# Patient Record
Sex: FEMALE | Race: WHITE | NOT HISPANIC OR LATINO
[De-identification: names, ages, dates, MRNs, and addresses within clinical notes are randomized per-mention and may not be internally consistent; named-entity substitution may affect disease eponyms.]

---

## 2022-12-05 PROBLEM — Z00.00 ENCOUNTER FOR PREVENTIVE HEALTH EXAMINATION: Status: ACTIVE | Noted: 2022-12-05

## 2022-12-13 ENCOUNTER — APPOINTMENT (OUTPATIENT)
Dept: ORTHOPEDIC SURGERY | Facility: CLINIC | Age: 57
End: 2022-12-13

## 2022-12-13 VITALS — HEIGHT: 66 IN | WEIGHT: 150 LBS | BODY MASS INDEX: 24.11 KG/M2

## 2022-12-13 DIAGNOSIS — M75.21 BICIPITAL TENDINITIS, RIGHT SHOULDER: ICD-10-CM

## 2022-12-13 DIAGNOSIS — Z78.9 OTHER SPECIFIED HEALTH STATUS: ICD-10-CM

## 2022-12-13 DIAGNOSIS — S83.412A SPRAIN OF MEDIAL COLLATERAL LIGAMENT OF LEFT KNEE, INITIAL ENCOUNTER: ICD-10-CM

## 2022-12-13 DIAGNOSIS — M25.511 PAIN IN RIGHT SHOULDER: ICD-10-CM

## 2022-12-13 PROCEDURE — 73562 X-RAY EXAM OF KNEE 3: CPT | Mod: LT

## 2022-12-13 PROCEDURE — 99203 OFFICE O/P NEW LOW 30 MIN: CPT

## 2022-12-13 PROCEDURE — 73030 X-RAY EXAM OF SHOULDER: CPT | Mod: RT

## 2022-12-13 NOTE — PHYSICAL EXAM
[Right] : right shoulder [AC Joint Arthrosis] : AC Joint Arthrosis [NL (0)] : extension 0 degrees [5___] : hamstring 5[unfilled]/5 [Negative] : negative Champ's [Left] : left knee [AP] : anteroposterior [Lateral] : lateral [There are no fractures, subluxations or dislocations. No significant abnormalities are seen] : There are no fractures, subluxations or dislocations. No significant abnormalities are seen [] : negative Lachmann [TWNoteComboBox7] : flexion 130 degrees

## 2022-12-13 NOTE — HISTORY OF PRESENT ILLNESS
[Gradual] : gradual [8] : 8 [Dull/Aching] : dull/aching [Constant] : constant [Meds] : meds [Walking] : walking [de-identified] : Has left knee soreness/tightness past few weeks. Active at the gym, pickle ball, doesn't recall an injury. No clicking, locking or giving way. Also right shoulder became sore over the past few days, felt locked up the other day, but now now bad [] : no [FreeTextEntry1] : lt knee [FreeTextEntry3] : 1 month [FreeTextEntry5] : pt stated she has been havin pain in her left knee with nni

## 2022-12-13 NOTE — DISCUSSION/SUMMARY
[de-identified] : For knee, ice, ibuprofen, flexible hinged knee support for sports. For right shoulder ice and observation

## 2022-12-15 ENCOUNTER — OFFICE (OUTPATIENT)
Dept: URBAN - METROPOLITAN AREA CLINIC 104 | Facility: CLINIC | Age: 57
Setting detail: OPHTHALMOLOGY
End: 2022-12-15
Payer: COMMERCIAL

## 2022-12-15 DIAGNOSIS — Z96.1: ICD-10-CM

## 2022-12-15 DIAGNOSIS — H26.493: ICD-10-CM

## 2022-12-15 PROCEDURE — 99024 POSTOP FOLLOW-UP VISIT: CPT | Performed by: OPTOMETRIST

## 2022-12-15 ASSESSMENT — SPHEQUIV_DERIVED
OS_SPHEQUIV: -1.75
OD_SPHEQUIV: 0.125
OS_SPHEQUIV: -1.5

## 2022-12-15 ASSESSMENT — REFRACTION_AUTOREFRACTION
OS_CYLINDER: -1.00
OD_AXIS: 155
OD_SPHERE: +0.25
OS_AXIS: 079
OS_SPHERE: -1.25
OD_CYLINDER: -0.25

## 2022-12-15 ASSESSMENT — REFRACTION_MANIFEST
OS_SPHERE: -1.00
OD_ADD: +2.50
OS_CYLINDER: -1.00
OS_VA1: 20/25
OS_AXIS: 080
OS_ADD: +2.50
OD_VA1: 20/20
OD_SPHERE: PLANO
OD_CYLINDER: -0.25
OD_AXIS: 160

## 2022-12-15 ASSESSMENT — LID EXAM ASSESSMENTS
OD_BLEPHARITIS: RLL RUL 2+
OS_BLEPHARITIS: LLL LUL 2+

## 2022-12-15 ASSESSMENT — VISUAL ACUITY
OS_BCVA: 20/20
OD_BCVA: 20/150

## 2022-12-15 ASSESSMENT — TEAR BREAK UP TIME (TBUT)
OS_TBUT: 1+ 2+
OD_TBUT: 1+ 2+

## 2022-12-15 ASSESSMENT — TONOMETRY
OD_IOP_MMHG: 16
OS_IOP_MMHG: 16

## 2022-12-15 ASSESSMENT — PACHYMETRY
OD_CT_CORRECTION: 1
OD_CT_UM: 539

## 2022-12-15 ASSESSMENT — CONFRONTATIONAL VISUAL FIELD TEST (CVF)
OD_FINDINGS: FULL
OS_FINDINGS: FULL

## 2022-12-19 ENCOUNTER — RX ONLY (RX ONLY)
Age: 57
End: 2022-12-19

## 2022-12-19 ENCOUNTER — OFFICE (OUTPATIENT)
Dept: URBAN - METROPOLITAN AREA CLINIC 104 | Facility: CLINIC | Age: 57
Setting detail: OPHTHALMOLOGY
End: 2022-12-19
Payer: COMMERCIAL

## 2022-12-19 DIAGNOSIS — H26.493: ICD-10-CM

## 2022-12-19 DIAGNOSIS — Z96.1: ICD-10-CM

## 2022-12-19 DIAGNOSIS — H26.492: ICD-10-CM

## 2022-12-19 PROCEDURE — 99024 POSTOP FOLLOW-UP VISIT: CPT | Performed by: OPHTHALMOLOGY

## 2022-12-19 PROCEDURE — 66821 AFTER CATARACT LASER SURGERY: CPT | Performed by: OPHTHALMOLOGY

## 2022-12-19 ASSESSMENT — TEAR BREAK UP TIME (TBUT)
OD_TBUT: 1+ 2+
OS_TBUT: 1+ 2+

## 2022-12-19 ASSESSMENT — LID EXAM ASSESSMENTS
OD_BLEPHARITIS: RLL RUL 2+
OS_BLEPHARITIS: LLL LUL 2+

## 2022-12-19 ASSESSMENT — VISUAL ACUITY
OD_BCVA: 20/150
OS_BCVA: 20/20

## 2022-12-19 ASSESSMENT — TONOMETRY
OS_IOP_MMHG: 11
OD_IOP_MMHG: 11

## 2022-12-19 ASSESSMENT — PACHYMETRY
OD_CT_UM: 539
OD_CT_CORRECTION: 1

## 2022-12-19 ASSESSMENT — CONFRONTATIONAL VISUAL FIELD TEST (CVF)
OD_FINDINGS: FULL
OS_FINDINGS: FULL

## 2022-12-30 ENCOUNTER — APPOINTMENT (OUTPATIENT)
Dept: ORTHOPEDIC SURGERY | Facility: CLINIC | Age: 57
End: 2022-12-30

## 2023-01-20 ENCOUNTER — APPOINTMENT (OUTPATIENT)
Dept: ORTHOPEDIC SURGERY | Facility: CLINIC | Age: 58
End: 2023-01-20

## 2023-01-27 ENCOUNTER — OFFICE (OUTPATIENT)
Dept: URBAN - METROPOLITAN AREA CLINIC 104 | Facility: CLINIC | Age: 58
Setting detail: OPHTHALMOLOGY
End: 2023-01-27
Payer: COMMERCIAL

## 2023-01-27 ENCOUNTER — RX ONLY (RX ONLY)
Age: 58
End: 2023-01-27

## 2023-01-27 DIAGNOSIS — H26.493: ICD-10-CM

## 2023-01-27 DIAGNOSIS — H26.491: ICD-10-CM

## 2023-01-27 PROCEDURE — 66821 AFTER CATARACT LASER SURGERY: CPT | Performed by: OPHTHALMOLOGY

## 2023-01-27 PROCEDURE — 99024 POSTOP FOLLOW-UP VISIT: CPT | Performed by: OPHTHALMOLOGY

## 2023-01-27 ASSESSMENT — TEAR BREAK UP TIME (TBUT)
OD_TBUT: 1+ 2+
OS_TBUT: 1+ 2+

## 2023-01-27 ASSESSMENT — CONFRONTATIONAL VISUAL FIELD TEST (CVF)
OD_FINDINGS: FULL
OS_FINDINGS: FULL

## 2023-01-27 ASSESSMENT — TONOMETRY
OD_IOP_MMHG: 13
OS_IOP_MMHG: 13

## 2023-01-27 ASSESSMENT — PACHYMETRY
OD_CT_CORRECTION: 1
OD_CT_UM: 539

## 2023-01-27 ASSESSMENT — VISUAL ACUITY
OD_BCVA: 20/150
OS_BCVA: 20/25

## 2023-01-27 ASSESSMENT — LID EXAM ASSESSMENTS
OD_BLEPHARITIS: RLL RUL 2+
OS_BLEPHARITIS: LLL LUL 2+

## 2023-02-17 ENCOUNTER — OFFICE (OUTPATIENT)
Dept: URBAN - METROPOLITAN AREA CLINIC 104 | Facility: CLINIC | Age: 58
Setting detail: OPHTHALMOLOGY
End: 2023-02-17
Payer: COMMERCIAL

## 2023-02-17 ENCOUNTER — RX ONLY (RX ONLY)
Age: 58
End: 2023-02-17

## 2023-02-17 DIAGNOSIS — H40.1131: ICD-10-CM

## 2023-02-17 DIAGNOSIS — H26.493: ICD-10-CM

## 2023-02-17 PROCEDURE — 99024 POSTOP FOLLOW-UP VISIT: CPT | Performed by: OPHTHALMOLOGY

## 2023-02-17 ASSESSMENT — LID EXAM ASSESSMENTS
OD_BLEPHARITIS: RLL RUL 2+
OS_BLEPHARITIS: LLL LUL 2+

## 2023-02-17 ASSESSMENT — VISUAL ACUITY
OD_BCVA: 20/125
OS_BCVA: 20/25+2

## 2023-02-17 ASSESSMENT — TEAR BREAK UP TIME (TBUT)
OS_TBUT: 1+ 2+
OD_TBUT: 1+ 2+

## 2023-02-17 ASSESSMENT — PACHYMETRY
OD_CT_UM: 539
OD_CT_CORRECTION: 1

## 2023-02-17 ASSESSMENT — TONOMETRY: OD_IOP_MMHG: 11

## 2023-02-17 ASSESSMENT — CONFRONTATIONAL VISUAL FIELD TEST (CVF)
OS_FINDINGS: FULL
OD_FINDINGS: FULL

## 2023-06-30 ENCOUNTER — OFFICE (OUTPATIENT)
Dept: URBAN - METROPOLITAN AREA CLINIC 104 | Facility: CLINIC | Age: 58
Setting detail: OPHTHALMOLOGY
End: 2023-06-30
Payer: COMMERCIAL

## 2023-06-30 DIAGNOSIS — H26.493: ICD-10-CM

## 2023-06-30 PROCEDURE — 99213 OFFICE O/P EST LOW 20 MIN: CPT | Performed by: OPHTHALMOLOGY

## 2023-06-30 ASSESSMENT — VISUAL ACUITY
OD_BCVA: 20/150+2
OS_BCVA: 20/25-1

## 2023-06-30 ASSESSMENT — REFRACTION_AUTOREFRACTION
OD_AXIS: 162
OD_SPHERE: +0.50
OS_AXIS: 055
OS_SPHERE: -1.75
OD_CYLINDER: -1.00
OS_CYLINDER: -0.25

## 2023-06-30 ASSESSMENT — LID EXAM ASSESSMENTS
OD_BLEPHARITIS: RLL RUL 2+
OS_BLEPHARITIS: LLL LUL 2+

## 2023-06-30 ASSESSMENT — PACHYMETRY
OD_CT_UM: 539
OD_CT_CORRECTION: 1

## 2023-06-30 ASSESSMENT — TONOMETRY
OD_IOP_MMHG: 12
OS_IOP_MMHG: 11

## 2023-06-30 ASSESSMENT — TEAR BREAK UP TIME (TBUT)
OD_TBUT: 1+ 2+
OS_TBUT: 1+ 2+

## 2023-06-30 ASSESSMENT — CONFRONTATIONAL VISUAL FIELD TEST (CVF)
OS_FINDINGS: FULL
OD_FINDINGS: FULL

## 2023-06-30 ASSESSMENT — SPHEQUIV_DERIVED
OD_SPHEQUIV: 0
OS_SPHEQUIV: -1.875

## 2023-10-30 ENCOUNTER — OFFICE (OUTPATIENT)
Dept: URBAN - METROPOLITAN AREA CLINIC 104 | Facility: CLINIC | Age: 58
Setting detail: OPHTHALMOLOGY
End: 2023-10-30
Payer: COMMERCIAL

## 2023-10-30 DIAGNOSIS — H40.1131: ICD-10-CM

## 2023-10-30 DIAGNOSIS — H52.13: ICD-10-CM

## 2023-10-30 DIAGNOSIS — H26.493: ICD-10-CM

## 2023-10-30 PROCEDURE — 92015 DETERMINE REFRACTIVE STATE: CPT | Performed by: OPHTHALMOLOGY

## 2023-10-30 PROCEDURE — 99213 OFFICE O/P EST LOW 20 MIN: CPT | Performed by: OPHTHALMOLOGY

## 2023-10-30 PROCEDURE — 92133 CPTRZD OPH DX IMG PST SGM ON: CPT | Performed by: OPHTHALMOLOGY

## 2023-10-30 ASSESSMENT — REFRACTION_AUTOREFRACTION
OD_SPHERE: +0.75
OD_AXIS: 161
OS_SPHERE: -1.75
OD_CYLINDER: -1.00

## 2023-10-30 ASSESSMENT — AXIALLENGTH_DERIVED
OD_AL: 23.4259
OD_AL: 23.4259

## 2023-10-30 ASSESSMENT — PACHYMETRY
OD_CT_CORRECTION: 1
OD_CT_UM: 539

## 2023-10-30 ASSESSMENT — KERATOMETRY
OD_K1POWER_DIOPTERS: 43.32
OD_AXISANGLE_DEGREES: 086
OS_K2POWER_DIOPTERS: 44.23
OS_K1POWER_DIOPTERS: 43.89
OD_K2POWER_DIOPTERS: 44.06
OS_AXISANGLE_DEGREES: 017

## 2023-10-30 ASSESSMENT — REFRACTION_MANIFEST
OD_SPHERE: +0.50
OD_VA1: 20/20
OD_CYLINDER: -0.50
OS_CYLINDER: SPHERE
OD_AXIS: 160
OS_SPHERE: -1.50
OS_VA1: 20/20

## 2023-10-30 ASSESSMENT — LID EXAM ASSESSMENTS
OS_BLEPHARITIS: LLL LUL 2+
OD_BLEPHARITIS: RLL RUL 2+

## 2023-10-30 ASSESSMENT — TEAR BREAK UP TIME (TBUT)
OS_TBUT: 1+ 2+
OD_TBUT: 1+ 2+

## 2023-10-30 ASSESSMENT — CONFRONTATIONAL VISUAL FIELD TEST (CVF)
OS_FINDINGS: FULL
OD_FINDINGS: FULL

## 2023-10-30 ASSESSMENT — TONOMETRY: OS_IOP_MMHG: 11

## 2023-10-30 ASSESSMENT — SPHEQUIV_DERIVED
OD_SPHEQUIV: 0.25
OD_SPHEQUIV: 0.25

## 2023-10-30 ASSESSMENT — VISUAL ACUITY
OD_BCVA: 20/200
OS_BCVA: 20/20

## 2024-02-26 ENCOUNTER — OFFICE (OUTPATIENT)
Dept: URBAN - METROPOLITAN AREA CLINIC 104 | Facility: CLINIC | Age: 59
Setting detail: OPHTHALMOLOGY
End: 2024-02-26
Payer: COMMERCIAL

## 2024-02-26 DIAGNOSIS — H40.1131: ICD-10-CM

## 2024-02-26 DIAGNOSIS — H26.493: ICD-10-CM

## 2024-02-26 DIAGNOSIS — H43.393: ICD-10-CM

## 2024-02-26 DIAGNOSIS — H01.002: ICD-10-CM

## 2024-02-26 DIAGNOSIS — H01.001: ICD-10-CM

## 2024-02-26 PROCEDURE — 92250 FUNDUS PHOTOGRAPHY W/I&R: CPT | Performed by: OPHTHALMOLOGY

## 2024-02-26 PROCEDURE — 99213 OFFICE O/P EST LOW 20 MIN: CPT | Performed by: OPHTHALMOLOGY

## 2024-02-26 ASSESSMENT — CONFRONTATIONAL VISUAL FIELD TEST (CVF)
OS_FINDINGS: FULL
OD_FINDINGS: FULL

## 2024-02-26 ASSESSMENT — TEAR BREAK UP TIME (TBUT)
OS_TBUT: 1+ 2+
OD_TBUT: 1+ 2+

## 2024-02-26 ASSESSMENT — LID EXAM ASSESSMENTS
OD_BLEPHARITIS: RLL RUL 2+
OS_BLEPHARITIS: LLL LUL 2+

## 2024-08-15 ENCOUNTER — OFFICE (OUTPATIENT)
Dept: URBAN - METROPOLITAN AREA CLINIC 104 | Facility: CLINIC | Age: 59
Setting detail: OPHTHALMOLOGY
End: 2024-08-15
Payer: COMMERCIAL

## 2024-08-15 DIAGNOSIS — H10.89: ICD-10-CM

## 2024-08-15 PROCEDURE — 99213 OFFICE O/P EST LOW 20 MIN: CPT | Performed by: OPTOMETRIST

## 2024-08-15 ASSESSMENT — LID EXAM ASSESSMENTS
OD_BLEPHARITIS: RLL RUL 2+
OS_BLEPHARITIS: LLL LUL 2+

## 2024-08-15 ASSESSMENT — CONFRONTATIONAL VISUAL FIELD TEST (CVF)
OD_FINDINGS: FULL
OS_FINDINGS: FULL

## 2024-09-30 ENCOUNTER — OFFICE (OUTPATIENT)
Dept: URBAN - METROPOLITAN AREA CLINIC 104 | Facility: CLINIC | Age: 59
Setting detail: OPHTHALMOLOGY
End: 2024-09-30
Payer: COMMERCIAL

## 2024-09-30 DIAGNOSIS — H17.9: ICD-10-CM

## 2024-09-30 DIAGNOSIS — H43.393: ICD-10-CM

## 2024-09-30 DIAGNOSIS — H40.1131: ICD-10-CM

## 2024-09-30 DIAGNOSIS — H35.40: ICD-10-CM

## 2024-09-30 DIAGNOSIS — H04.123: ICD-10-CM

## 2024-09-30 DIAGNOSIS — H26.493: ICD-10-CM

## 2024-09-30 DIAGNOSIS — H01.005: ICD-10-CM

## 2024-09-30 DIAGNOSIS — H01.001: ICD-10-CM

## 2024-09-30 DIAGNOSIS — H16.223: ICD-10-CM

## 2024-09-30 DIAGNOSIS — Z96.1: ICD-10-CM

## 2024-09-30 DIAGNOSIS — H01.002: ICD-10-CM

## 2024-09-30 DIAGNOSIS — H01.004: ICD-10-CM

## 2024-09-30 PROCEDURE — 99213 OFFICE O/P EST LOW 20 MIN: CPT | Performed by: OPHTHALMOLOGY

## 2024-09-30 PROCEDURE — 92083 EXTENDED VISUAL FIELD XM: CPT | Performed by: OPHTHALMOLOGY

## 2024-09-30 ASSESSMENT — CONFRONTATIONAL VISUAL FIELD TEST (CVF)
OS_FINDINGS: FULL
OD_FINDINGS: FULL

## 2024-12-05 ENCOUNTER — OFFICE (OUTPATIENT)
Dept: URBAN - METROPOLITAN AREA CLINIC 104 | Facility: CLINIC | Age: 59
Setting detail: OPHTHALMOLOGY
End: 2024-12-05
Payer: COMMERCIAL

## 2024-12-05 DIAGNOSIS — H43.393: ICD-10-CM

## 2024-12-05 DIAGNOSIS — H04.123: ICD-10-CM

## 2024-12-05 DIAGNOSIS — H40.1131: ICD-10-CM

## 2024-12-05 DIAGNOSIS — H01.005: ICD-10-CM

## 2024-12-05 DIAGNOSIS — H16.223: ICD-10-CM

## 2024-12-05 DIAGNOSIS — H01.001: ICD-10-CM

## 2024-12-05 DIAGNOSIS — H26.493: ICD-10-CM

## 2024-12-05 DIAGNOSIS — H35.40: ICD-10-CM

## 2024-12-05 DIAGNOSIS — Z96.1: ICD-10-CM

## 2024-12-05 DIAGNOSIS — H01.004: ICD-10-CM

## 2024-12-05 DIAGNOSIS — H01.002: ICD-10-CM

## 2024-12-05 DIAGNOSIS — H17.9: ICD-10-CM

## 2024-12-05 PROBLEM — H11.152 PINGUECULA; LEFT EYE: Status: ACTIVE | Noted: 2024-12-05

## 2024-12-05 PROCEDURE — 99213 OFFICE O/P EST LOW 20 MIN: CPT | Performed by: OPHTHALMOLOGY

## 2024-12-05 ASSESSMENT — TEAR BREAK UP TIME (TBUT)
OD_TBUT: 1+ 2+
OS_TBUT: 1+ 2+

## 2024-12-05 ASSESSMENT — PACHYMETRY
OD_CT_UM: 539
OD_CT_CORRECTION: 1

## 2024-12-05 ASSESSMENT — KERATOMETRY: METHOD_AUTO_MANUAL: AUTO

## 2024-12-05 ASSESSMENT — LID EXAM ASSESSMENTS
OS_BLEPHARITIS: LLL LUL 2+
OD_BLEPHARITIS: RLL RUL 2+

## 2024-12-05 ASSESSMENT — VISUAL ACUITY
OD_BCVA: 20/125
OS_BCVA: 20/20

## 2024-12-05 ASSESSMENT — TONOMETRY: OS_IOP_MMHG: 10

## 2025-04-07 ENCOUNTER — OFFICE (OUTPATIENT)
Dept: URBAN - METROPOLITAN AREA CLINIC 104 | Facility: CLINIC | Age: 60
Setting detail: OPHTHALMOLOGY
End: 2025-04-07
Payer: COMMERCIAL

## 2025-04-07 DIAGNOSIS — H40.1131: ICD-10-CM

## 2025-04-07 DIAGNOSIS — H01.002: ICD-10-CM

## 2025-04-07 DIAGNOSIS — H26.493: ICD-10-CM

## 2025-04-07 DIAGNOSIS — H01.001: ICD-10-CM

## 2025-04-07 PROCEDURE — 92014 COMPRE OPH EXAM EST PT 1/>: CPT | Performed by: OPHTHALMOLOGY

## 2025-04-07 PROCEDURE — 92133 CPTRZD OPH DX IMG PST SGM ON: CPT | Performed by: OPHTHALMOLOGY

## 2025-04-07 ASSESSMENT — REFRACTION_AUTOREFRACTION
OS_CYLINDER: -1.00
OS_SPHERE: -0.75
OD_CYLINDER: -0.50
OD_SPHERE: +1.00
OD_AXIS: 144
OS_AXIS: 086

## 2025-04-07 ASSESSMENT — VISUAL ACUITY
OS_BCVA: 20/20
OD_BCVA: 20/100

## 2025-04-07 ASSESSMENT — LID EXAM ASSESSMENTS
OS_BLEPHARITIS: LLL LUL 2+
OD_BLEPHARITIS: RLL RUL 2+

## 2025-04-07 ASSESSMENT — KERATOMETRY
OD_K2POWER_DIOPTERS: 44.00
OS_AXISANGLE_DEGREES: 110
OS_K1POWER_DIOPTERS: 43.83
OD_AXISANGLE_DEGREES: 081
OS_K2POWER_DIOPTERS: 43.89
METHOD_AUTO_MANUAL: AUTO
OD_K1POWER_DIOPTERS: 43.44

## 2025-04-07 ASSESSMENT — CONFRONTATIONAL VISUAL FIELD TEST (CVF)
OD_FINDINGS: FULL
OS_FINDINGS: FULL

## 2025-04-07 ASSESSMENT — TEAR BREAK UP TIME (TBUT)
OS_TBUT: 1+ 2+
OD_TBUT: 1+ 2+

## 2025-04-07 ASSESSMENT — PACHYMETRY
OD_CT_CORRECTION: 1
OD_CT_UM: 539

## 2025-04-07 ASSESSMENT — TONOMETRY
OS_IOP_MMHG: 12
OD_IOP_MMHG: 12